# Patient Record
(demographics unavailable — no encounter records)

---

## 2024-10-30 NOTE — HISTORY OF PRESENT ILLNESS
[9] : 9 [5] : 5 [Dull/Aching] : dull/aching [Not working due to injury] : Work status: not working due to injury [] : Post Surgical Visit: yes [de-identified] : 3.5 mos s/p L TKA, working with PT, rom improving, denies n/v/f/c, using naproxen and tylenol for pain  3/8/24: She is requesting a note to return to work.   6/5/24: pt is here today for left knee. pt states she still has major pain wtihin the knee and it is radiating down into lt calf. pt also states she does fele like from the bunion on her ftoot that she is ''leaning'' towards the left when she walks.  7/3/24: Pt is here today for MRI of left knee and bloodwork results. CRP elevated. She continues to complain of pain in the left knee. Requests work note, stating that she can rest for short periods while at work.   8/14/24: pt is here today for f/u on L knee. pt states she is still having pain within her knee, pt also states she has been having a type of ''bouncing, twisting'' feeling within her knee. pt also would like to go over L knee fluid results and requested a utlrasound to overview her knee.  10/30/24: pt is here today for f/u on L knee, states she still has pain within the knee and states she had to stop PT because her  left, she has been working double shifts and feels like her pain has been worse since then [FreeTextEntry1] : L knee [de-identified] : physical therapy  [de-identified] : 11/03/23 [de-identified] : LEFT KNEE ARTHROPLASTY WITH GREG ROBOTIC

## 2024-10-30 NOTE — PHYSICAL EXAM
[5___] : hamstring 5[unfilled]/5 [Left] : left knee [There are no fractures, subluxations or dislocations. No significant abnormalities are seen] : There are no fractures, subluxations or dislocations. No significant abnormalities are seen [No loss of surgical correlation. Bony alignment acceptable. Hardware in appropriate position] : No loss of surgical correlation. Bony alignment acceptable. Hardware in appropriate position [] : no drainage [TWNoteComboBox7] : flexion 100 degrees [de-identified] : extension 3 degrees

## 2024-10-30 NOTE — ASSESSMENT
[FreeTextEntry1] : 52F 1yrs s/p L TKA  Continue HEP, no time for formal therapy at the moment continue activities as tolerated return 2-3 mo   The patient was advised of the diagnosis. The natural history of the pathology was explained in full to the patient in layman's terms. All questions were answered. The risks and benefits of surgical and non-surgical treatment alternatives were explained in full to the patient.         
MED/SURG

## 2024-12-06 NOTE — ASSESSMENT
[FreeTextEntry1] : 52F 1yrs s/p L TKA  SLU complete, 45% Continue HEP, no time for formal therapy at the moment continue activities as tolerated return 1 year   The patient was advised of the diagnosis. The natural history of the pathology was explained in full to the patient in layman's terms. All questions were answered. The risks and benefits of surgical and non-surgical treatment alternatives were explained in full to the patient.

## 2024-12-06 NOTE — WORK
[Has the patient reached Maximum Medical Improvement? If yes, indicate date___] : Yes, on [unfilled] [Is there permanent partial impairment?] : Yes [Left] : left [FreeTextEntry1] : good [FreeTextEntry6] : left knee [FreeTextEntry7] : knee [FreeTextEntry8] : 0-90 [FreeTextEntry5] : 45

## 2024-12-06 NOTE — HISTORY OF PRESENT ILLNESS
[9] : 9 [5] : 5 [Dull/Aching] : dull/aching [Not working due to injury] : Work status: not working due to injury [] : Post Surgical Visit: yes [de-identified] : 3.5 mos s/p L TKA, working with PT, rom improving, denies n/v/f/c, using naproxen and tylenol for pain  3/8/24: She is requesting a note to return to work.   6/5/24: pt is here today for left knee. pt states she still has major pain wtihin the knee and it is radiating down into lt calf. pt also states she does fele like from the bunion on her ftoot that she is ''leaning'' towards the left when she walks.  7/3/24: Pt is here today for MRI of left knee and bloodwork results. CRP elevated. She continues to complain of pain in the left knee. Requests work note, stating that she can rest for short periods while at work.   8/14/24: pt is here today for f/u on L knee. pt states she is still having pain within her knee, pt also states she has been having a type of ''bouncing, twisting'' feeling within her knee. pt also would like to go over L knee fluid results and requested a utlrasound to overview her knee.  10/30/24: pt is here today for f/u on L knee, states she still has pain within the knee and states she had to stop PT because her  left, she has been working double shifts and feels like her pain has been worse since then  12/06/2024: pt is here today for f/u on L knee, states she is still having pain on the inside of her knee and foot, unable to ride a bike [FreeTextEntry1] : L knee [de-identified] : physical therapy  [de-identified] : 11/03/23 [de-identified] : LEFT KNEE ARTHROPLASTY WITH GREG ROBOTIC

## 2024-12-06 NOTE — PHYSICAL EXAM
[5___] : hamstring 5[unfilled]/5 [Left] : left knee [There are no fractures, subluxations or dislocations. No significant abnormalities are seen] : There are no fractures, subluxations or dislocations. No significant abnormalities are seen [No loss of surgical correlation. Bony alignment acceptable. Hardware in appropriate position] : No loss of surgical correlation. Bony alignment acceptable. Hardware in appropriate position [] : no drainage [TWNoteComboBox7] : flexion 90 degrees [de-identified] : extension 3 degrees

## 2025-02-19 NOTE — ASSESSMENT
[FreeTextEntry1] : 52F 1yrs s/p L TKA  SLU complete, 45% Continue HEP, no time for formal therapy at the moment continue activities as tolerated return 3 mo   The patient was advised of the diagnosis. The natural history of the pathology was explained in full to the patient in layman's terms. All questions were answered. The risks and benefits of surgical and non-surgical treatment alternatives were explained in full to the patient.

## 2025-02-19 NOTE — HISTORY OF PRESENT ILLNESS
[9] : 9 [5] : 5 [Dull/Aching] : dull/aching [Not working due to injury] : Work status: not working due to injury [] : Post Surgical Visit: yes [de-identified] : 3.5 mos s/p L TKA, working with PT, rom improving, denies n/v/f/c, using naproxen and tylenol for pain  3/8/24: She is requesting a note to return to work.   6/5/24: pt is here today for left knee. pt states she still has major pain wtihin the knee and it is radiating down into lt calf. pt also states she does fele like from the bunion on her ftoot that she is ''leaning'' towards the left when she walks.  7/3/24: Pt is here today for MRI of left knee and bloodwork results. CRP elevated. She continues to complain of pain in the left knee. Requests work note, stating that she can rest for short periods while at work.   8/14/24: pt is here today for f/u on L knee. pt states she is still having pain within her knee, pt also states she has been having a type of ''bouncing, twisting'' feeling within her knee. pt also would like to go over L knee fluid results and requested a utlrasound to overview her knee.  10/30/24: pt is here today for f/u on L knee, states she still has pain within the knee and states she had to stop PT because her  left, she has been working double shifts and feels like her pain has been worse since then  12/06/2024: pt is here today for f/u on L knee, states she is still having pain on the inside of her knee and foot, unable to ride a bike  2/19/2025: Patient is here today for f/u on L knee, states she was seen by [facility] last week due to some pain within the leg. was told she has a heel spur. wants to talk about alternatives. [FreeTextEntry1] : L knee [de-identified] : physical therapy  [de-identified] : 11/03/23 [de-identified] : LEFT KNEE ARTHROPLASTY WITH GREG ROBOTIC

## 2025-02-19 NOTE — PHYSICAL EXAM
[5___] : hamstring 5[unfilled]/5 [Left] : left knee [There are no fractures, subluxations or dislocations. No significant abnormalities are seen] : There are no fractures, subluxations or dislocations. No significant abnormalities are seen [No loss of surgical correlation. Bony alignment acceptable. Hardware in appropriate position] : No loss of surgical correlation. Bony alignment acceptable. Hardware in appropriate position [] : no drainage [TWNoteComboBox7] : flexion 90 degrees [de-identified] : extension 3 degrees

## 2025-03-10 NOTE — HISTORY OF PRESENT ILLNESS
[FreeTextEntry1] : 53-year-old female seen in 2021 for colonoscopy both for screening and for evaluation of intermittent lower abdominal pain.  Colonoscopy was negative.  Pain possibly musculoskeletal/radicular versus IBS.  Was placed on dicyclomine no longer taking.  Has had a left total knee replacement in the interim.  Still with chronic back and joint pains at times.  Has been experiencing GERD/dyspeptic symptoms including belching and heartburn.  Chief complaint is an acidic taste in her mouth and halitosis.  Recently saw GYN and was given a 5-day course of an antibiotic for "infection in my stomach".  No record of what that might have been as there are no GYN notes in the chart.  Never had EGD.

## 2025-03-10 NOTE — ASSESSMENT
[FreeTextEntry1] : Impression: GERD/dyspepsia.  Possibly may be causing bad taste and halitosis.  Negative colonoscopy 2021 with no colorectal cancer risk factors.  Recommend 5 to 7-year repeat colonoscopy, not due now.  Plan: Will schedule EGD with biopsy.  Trial of pantoprazole 40 mg daily.  Suggest patient get dental/periodontal evaluation as well